# Patient Record
(demographics unavailable — no encounter records)

---

## 2025-03-14 NOTE — ASSESSMENT
[FreeTextEntry1] : 31 year old woman with positive JOSLYN, +SSa antibody, s/p lip biopsy consistent with Sjogren's syndrome returns for rheumatology follow up. At this time, patient overall feeling well, reports symptoms of fatigue and minimal dry mouth without dry eyes. Patent reports history of left sided headache, concerned about possible Sjogrens related, no other signs or symptoms of active Sjogrens at this time. Patient with family history of RA, patient with elevated RF, CCP normal, without evidence of an inflammatory arthritis at this time. Patent reports onset of panic attacks since diagnosis, has been following with therapist weekly which has helped and currently feeling much better, no longer having chest pain or paresthesia.  Reviewed previous blood work with patient, will update labs today in office. Discussed follow up with ophthalmologist and dentist regularly, discussed using sunscreen daily. At this time will continue to monitor and will not start any medications.  Given anti SSa antibody positive, discussed low risk of  lupus, congenital heart block (CHB) is of most concern and is associated with anti-Ro/SS-A antibodies. Will update labs today in office, will check B12, tsh and folate as well. Discussed neurology evaluation, has appointment with new PMD in two weeks as well. Patient will see neurologist as well regarding headache. Further management pending results.

## 2025-03-14 NOTE — HISTORY OF PRESENT ILLNESS
[FreeTextEntry1] : March 11, 2025 Patient returns for follow up  Reports headaches at night for the past month Joint pain in the neck, shoulders, and knees Neck pain all the time, low ache feeling Headaches specifically before bed, worse when lying down Maybe present during the day Takes Tylenol one or two times, but usually does not take  Sometimes makes it harder to fall asleep, does not wake her up from sleep No medicines on regular basis daily vitamin and probiotic supplement No magnesium No other symptoms, no sicca symptoms Has appointment in two weeks for new PMD, Dr. Iraida Joiner Trying to exercise 1-2 weeks, weights and cardio, just started to get into yoga No dry mouth or dry eyes No oral ulcers +dentist, no cavities No joint swelling  Sometimes feels some LN swelling No morning stiffness, sometimes some mild neck  Headache only on the left side, feels like "prickly," worse with pressing on the scalp Patient is not symptomatic at this time Feels more fatigue with sugar  June 6, 2024 Patient returns for follow up Patient overall feeling much improvement since last visit Recent evaluation by PMD, labs reviewed, CBC normal  Patient started seeing therapist and feels much better with therapy No longer having chest pain or tingling of the arms  Does still report Feeling fatigued but it is manageable and dry eyes Feels tired very easily, takes naps in the afternoon, working from home No aches and pain, no joint symptoms Does not use artificial tears, if feels symptoms, removes contacts and feels better Exercising regularly, cardio and weights, about 2-3 times per weeks  Takes Vitamins No dietary restrictions Scheduled for eye doctor soon Joint pains not noticed since last visit No joint swelling No morning stiffness  March 7, 2024 30 year old woman referred for initial evaluation. Referred by PCP Patient with positive JOSLYN at the end of last year At that time, patient was having bruising and slow healing, evaluated by dermatologist, had skin biopsy which was normal, patient will forward results to office Patient was evaluated by another rheumatologist, noted to have positive SSa, with SSb negative, s/p lip biopsy, diagnosed with Sjogren's, prescribed hydroxychloroquine but did not start to date. Here for a second opinion  Reports fatigue and some joint pain over the last year Reports swelling at night over the last month, sometimes in ankles Patient still has hyperpigmented skin lesion on the lower left leg from previous bruising and slow healing No oral ulcers, does have rare canker sores Dry mouth noted, no dry eye symptoms Reports problems with cavities, though patient notes eating a lot of sugar No morning stiffness No photosensitivity Reviewed labs on patient phone, will forward to office by email.  Reports panic attacks since diagnosis, reports some tingling sensation on arms when anxious, following with therapist once a week, not taking any medications Patient currently taking multivitamin and fish oil Most recent vitamin D 16, advised to take 1000 units of vitamin D qday in addition to multivitamin patient is already taking  Follows with optometry and dentist regularly Patient does not have regular menses, every 6-7 weeks since adolescence + Family history of RA (grandfather), thyroid disease (sister) No plans for pregnancy at this time Patient exercises with weights and cardio 3x a week No fevers, chills or weight loss No LAD

## 2025-03-14 NOTE — PHYSICAL EXAM
[General Appearance - Alert] : alert [General Appearance - In No Acute Distress] : in no acute distress [General Appearance - Well Nourished] : well nourished [General Appearance - Well Developed] : well developed [General Appearance - Well-Appearing] : healthy appearing [Sclera] : the sclera and conjunctiva were normal [Examination Of The Oral Cavity] : the lips and gums were normal [Oropharynx] : the oropharynx was normal [Neck Appearance] : the appearance of the neck was normal [Neck Cervical Mass (___cm)] : no neck mass was observed [Respiration, Rhythm And Depth] : normal respiratory rhythm and effort [Exaggerated Use Of Accessory Muscles For Inspiration] : no accessory muscle use [Auscultation Breath Sounds / Voice Sounds] : lungs were clear to auscultation bilaterally [Heart Rate And Rhythm] : heart rate was normal and rhythm regular [Heart Sounds] : normal S1 and S2 [Murmurs] : no murmurs [Edema] : there was no peripheral edema [Cervical Lymph Nodes Enlarged Posterior Bilaterally] : posterior cervical [Cervical Lymph Nodes Enlarged Anterior Bilaterally] : anterior cervical [Supraclavicular Lymph Nodes Enlarged Bilaterally] : supraclavicular [] : no rash [Oriented To Time, Place, And Person] : oriented to person, place, and time [Impaired Insight] : insight and judgment were intact [Affect] : the affect was normal [Mood] : the mood was normal [Abnormal Walk] : normal gait [Nail Clubbing] : no clubbing  or cyanosis of the fingernails [Musculoskeletal - Swelling] : no joint swelling seen [Motor Tone] : muscle strength and tone were normal [FreeTextEntry1] : No active synovitis of the upper and lower extremities bilaterally.